# Patient Record
Sex: FEMALE | Race: WHITE
[De-identification: names, ages, dates, MRNs, and addresses within clinical notes are randomized per-mention and may not be internally consistent; named-entity substitution may affect disease eponyms.]

---

## 2019-06-24 ENCOUNTER — HOSPITAL ENCOUNTER (OUTPATIENT)
Dept: HOSPITAL 47 - RADCTMAIN | Age: 24
Discharge: HOME | End: 2019-06-24
Payer: COMMERCIAL

## 2019-06-24 DIAGNOSIS — J32.8: Primary | ICD-10-CM

## 2019-06-24 PROCEDURE — 70460 CT HEAD/BRAIN W/DYE: CPT

## 2019-06-24 NOTE — CT
EXAMINATION TYPE: CT brain w con

 

DATE OF EXAM: 6/24/2019

 

COMPARISON: 2/7/2003

 

HISTORY: 24-year-old female Vertigo

 

CT DLP: 1198 mGycm

Automated exposure control for dose reduction was used.

 

Technique: CT scan of the head is performed with IV Contrast, patient injected with 100 mL of Isovue 
300. Coronal/sagittal reconstructions performed.

 

FINDINGS: 

There is no abnormal enhancing mass or midline shift identified.  No craniocervical junction abnormal
ity. No empty sella. The ventricles and sulci are within normal limits in size.

 

The globes are intact.

 

Lobulated polyps or mucosal retention cysts along the floors of the maxillary sinuses. Mild mucosal t
hickening ethmoid air cells. Mastoid air cells are well pneumatized. Orbits and globes are intact.

 

 

IMPRESSION: 

Negative contrast enhanced head CT exam. Mild chronic maxillary and ethmoid sinus disease.

## 2020-07-14 ENCOUNTER — HOSPITAL ENCOUNTER (EMERGENCY)
Dept: HOSPITAL 47 - EC | Age: 25
Discharge: HOME | End: 2020-07-14
Payer: COMMERCIAL

## 2020-07-14 VITALS
TEMPERATURE: 98.9 F | HEART RATE: 86 BPM | DIASTOLIC BLOOD PRESSURE: 92 MMHG | SYSTOLIC BLOOD PRESSURE: 123 MMHG | RESPIRATION RATE: 18 BRPM

## 2020-07-14 DIAGNOSIS — F41.9: Primary | ICD-10-CM

## 2020-07-14 PROCEDURE — 93005 ELECTROCARDIOGRAM TRACING: CPT

## 2020-07-14 PROCEDURE — 99283 EMERGENCY DEPT VISIT LOW MDM: CPT

## 2020-07-14 NOTE — ED
Anxiety HPI





- General


Chief Complaint: Anxiety


Stated Complaint: Anxiety


Time Seen by Provider: 07/14/20 14:07


Source: patient, family


Mode of arrival: wheelchair





- History of Present Illness


Initial Comments: 


25-year-old female presents today for chief complaint of anxiety possible panic 

attack.  Patient states she has history of depression and anxiety.  She states 

she does have a history of panic attacks.  She said she has not happened in 

quite some time.  Patient states the past 2 days should have episodes where she 

feels sweaty feels like the world is going to end and she is going to die.  

Patient states that that time she has some small inconsistent chest pain that is

sharp in nature.  She denies shortness of breath.  Patient states they go away 

after about 10 minutes.  Patient states he feels similar to when she's had panic

attacks in the past.  Patient states she is on Abilify she does not believe it 

is really helping.  Patient denies any suicidal or homicidal ideation.  Patient 

denies any current chest pain associated chest pressure.  Patient denies any 

pain with inspiration leg swelling hemoptysis history of cancer DVT or pulmonary

embolism.  Patient denies any history of clotting disorders denies any recent 

surgeries exogenous hormone use or pregnancy.  Patient denies any jaw or arm 

pain.  Patient upon arrival.  She felt no signs of acute distress, VS within 

acceptable limits HR WNL.








- Related Data


Home Medications: 


                                  Previous Rx's











 Medication  Instructions  Recorded


 


ALPRAZolam [Xanax] 0.25 mg PO HS PRN 3 Days #3 tab 07/14/20











Allergies/Adverse Reactions: 


                                    Allergies











Allergy/AdvReac Type Severity Reaction Status Date / Time


 


No Known Allergies Allergy   Verified 07/14/20 13:46














Review of Systems


ROS Statement: 


Those systems with pertinent positive or pertinent negative responses have been 

documented in the HPI.





ROS Other: All systems not noted in ROS Statement are negative.





Past Medical History


Past Medical History: No Reported History


History of Any Multi-Drug Resistant Organisms: None Reported


Past Surgical History: No Surgical Hx Reported


Past Psychological History: Anxiety, Depression


Smoking Status: Never smoker


Past Alcohol Use History: Occasional


Past Drug Use History: Marijuana





General Exam





- General Exam Comments


Initial Comments: 


General:  The patient is awake and alert, in no distress


Eye:  Pupils are equal, round and reactive to light, extra-ocular movements are 

intact.  No nystagmus.  There is normal conjunctiva bilaterally.  No signs of 

icterus.  


Ears, nose, mouth and throat:  There are moist mucous membranes and no oral 

lesions. 


Neck:  The neck is supple, there is no tenderness or JVD.  


Cardiovascular:  There is a regular rate and rhythm. No murmur, rub or gallop is

 appreciated.


Respiratory:  Lungs are clear to auscultation, respirations are non-labored, 

breath sounds are equal.  No wheezes, stridor, rales, or rhonchi.


Gastrointestinal:  Soft, non-distended, non-tender abdomen without masses or 

organomegaly noted. There is no rebound or guarding present. 


Musculoskeletal:  Normal ROM, no tenderness.  Strength 5/5. Sensation intact. 

Radial pulses equal bilaterally 2+.  


Neurological:  A&O x 3. CN II-XII intact, There are no obvious motor or sensory 

deficits. Coordination appears grossly intact. Speech is normal.


Skin:  Skin is warm and dry and no rashes or lesions are noted. No LE edema or 

swelling. No calf pain to palpation.


Psychiatric:  Cooperative, appropriate mood & affect, normal judgment.  








Course


                                   Vital Signs











  07/14/20 07/14/20





  13:42 14:49


 


Temperature 98.9 F 98.9 F


 


Pulse Rate 86 86


 


Respiratory 18 18





Rate  


 


Blood Pressure 123/92 123/92


 


O2 Sat by Pulse 98 98





Oximetry  














Medical Decision Making





- Medical Decision Making


25-year-old female presented for chief complaint of anxiety. Patient has history

 of panic disorder. PE unremarkable. PERC (-). Low heart score, CP does not 

appear typical. EKG WNL Patient HR WNL. She does not appear toxic. No current 

chest pain. Patient after discussing symptoms is agreeable to discharge with 

anxiety about of medications when necessary.  She is to follow-up with 

psychiatry return for homicide ideation or persistent chest pain. Patient 

verbalized that she feels this is all secondary to anxiety. Patient agreeable to

 discharge and return parameters. DIscused case with Dr. Vazquez who is 

agreeable to this care plan.








Disposition


Clinical Impression: 


 Anxiety, History of panic disorder





Disposition: HOME SELF-CARE


Condition: Stable


Instructions (If sedation given, give patient instructions):  Generalized 

Anxiety Disorder (ED), Panic Attack (ED)


Additional Instructions: 


Please use medication as discussed.  Please follow-up with family doctor in the 

next 2 days.  Please return to emergency room if the symptoms increase or worsen

 or for any other concerns.


Prescriptions: 


ALPRAZolam [Xanax] 0.25 mg PO HS PRN 3 Days #3 tab


 PRN Reason: Anxiety


Is patient prescribed a controlled substance at d/c from ED?: No


Referrals: 


Suzette Valadez DO [Primary Care Provider] - 1-2 days


Time of Disposition: 14:41

## 2021-12-04 ENCOUNTER — HOSPITAL ENCOUNTER (EMERGENCY)
Dept: HOSPITAL 47 - EC | Age: 26
Discharge: HOME | End: 2021-12-04
Payer: COMMERCIAL

## 2021-12-04 VITALS — HEART RATE: 90 BPM

## 2021-12-04 VITALS — RESPIRATION RATE: 18 BRPM | TEMPERATURE: 99.3 F

## 2021-12-04 VITALS — SYSTOLIC BLOOD PRESSURE: 121 MMHG | DIASTOLIC BLOOD PRESSURE: 75 MMHG

## 2021-12-04 DIAGNOSIS — U07.1: Primary | ICD-10-CM

## 2021-12-04 PROCEDURE — 71046 X-RAY EXAM CHEST 2 VIEWS: CPT

## 2021-12-04 PROCEDURE — 93005 ELECTROCARDIOGRAM TRACING: CPT

## 2021-12-04 PROCEDURE — 87635 SARS-COV-2 COVID-19 AMP PRB: CPT

## 2021-12-04 PROCEDURE — 99284 EMERGENCY DEPT VISIT MOD MDM: CPT

## 2021-12-04 NOTE — ED
General Adult HPI





- General


Chief complaint: Upper Respiratory Infection


Stated complaint: Cough/Headache/Vomiting


Time Seen by Provider: 12/04/21 13:00


Source: patient, RN notes reviewed, old records reviewed


Mode of arrival: ambulatory


Limitations: no limitations





- History of Present Illness


Initial comments: 





This is a 26 year old female presents emergency Department body aches cough and 

a low-grade fever.  Patient states it started about 4 days ago.  Patient states 

she's also lost some of her taste and smell.  Patient also said diarrhea.  

Patient complains of chest achiness as well as overall body achiness.  Patient 

came in today because she was concerned about the chest achiness.  Patient 

denies any shortness of breath or difficulty breathing.  Patient denies any 

abdominal pain patient denies any back pain.  Patient denies lightheadedness or 

dizziness.





- Related Data


                                  Previous Rx's











 Medication  Instructions  Recorded


 


ALPRAZolam [Xanax] 0.25 mg PO HS PRN 3 Days #3 tab 07/14/20











                                    Allergies











Allergy/AdvReac Type Severity Reaction Status Date / Time


 


No Known Allergies Allergy   Verified 12/04/21 11:18














Review of Systems


ROS Statement: 


Those systems with pertinent positive or pertinent negative responses have been 

documented in the HPI.





ROS Other: All systems not noted in ROS Statement are negative.





Past Medical History


Past Medical History: No Reported History


History of Any Multi-Drug Resistant Organisms: None Reported


Past Surgical History: No Surgical Hx Reported


Past Psychological History: Anxiety, Depression


Smoking Status: Never smoker


Past Alcohol Use History: Occasional


Past Drug Use History: Marijuana





General Exam





- General Exam Comments


Initial Comments: 





GENERAL:


Patient is well-developed and well-nourished.  Patient is nontoxic and well-

hydrated and is in mild distress.





ENT:


Neck is soft and supple.  No significant lymphadenopathy is noted.  Oropharynx 

is clear.  Moist mucous membranes.  Neck has full range of motion without 

eliciting any pain.  





EYES:


The sclera were anicteric and conjunctiva were pink and moist.  Extraocular 

movements were intact and pupils were equal round and reactive to light.  Eye

lids were unremarkable.





PULMONARY:


Unlabored respirations.  Good breath sounds bilaterally.  No audible rales 

rhonchi or wheezing was noted.





CARDIOVASCULAR:


There is a regular rate and rhythm without any murmurs gallops or rubs. 





ABDOMEN:


Soft and nontender with normal bowel sounds.  





SKIN:


Skin is clear with no lesions or rashes and otherwise unremarkable.





NEUROLOGIC:


Patient is alert and oriented x3.  Cranial nerves II through XII are grossly 

intact.  Motor and sensory are also intact.  Normal speech, volume and content. 

 Symmetrical smile.





MUSCULOSKELETAL:


Normal extremities with adequate strength and full range of motion.  





LYMPHATICS:


No significant lymphadenopathy is noted





PSYCHIATRIC:


Normal psychiatric evaluation.  


Limitations: no limitations





Course


                                   Vital Signs











  12/04/21 12/04/21 12/04/21





  11:18 12:50 12:52


 


Temperature 99.3 F  


 


Pulse Rate 89 90 


 


Respiratory 18  





Rate   


 


Blood Pressure   121/75


 


O2 Sat by Pulse  98 





Oximetry   














Medical Decision Making





- Medical Decision Making





Patient is positive for COVID.  Chest x-ray shows no signs of pneumonia.





I offered the patient monoclonal antibodies but she refused.





Patient's EKG shows normal sinus rhythm at 80 bpm OK interval is 150 QRS is 82 

QT interval 348 QTC is 421 per patient's EKG shows no ST segment elevation or 

depression.





- Lab Data


                                   Lab Results











  12/04/21 Range/Units





  11:25 


 


Coronavirus (PCR)  Detected A  (Not Detectd)  














Disposition


Clinical Impression: 


 COVID





Disposition: HOME SELF-CARE


Condition: Good


Instructions (If sedation given, give patient instructions):  Coronavirus 

Disease 2019 (COVID-19)


Is patient prescribed a controlled substance at d/c from ED?: No


Referrals: 


Ehsan Hsu MD [Primary Care Provider] - 1-2 days


Time of Disposition: 13:55

## 2021-12-04 NOTE — XR
EXAMINATION TYPE: XR chest 2V

 

DATE OF EXAM: 12/4/2021

 

COMPARISON: The

 

HISTORY: Chest pain

 

TECHNIQUE:  Frontal and lateral views of the chest are obtained.

 

FINDINGS:  

 

There is no focal air space opacity.

 

No evidence for pneumothorax.  No pleural effusion.

 

The cardiac silhouette size is within normal limits.

 

The osseous structures are grossly intact.

 

IMPRESSION:  

 

1.  No acute cardiopulmonary process.

## 2021-12-09 ENCOUNTER — HOSPITAL ENCOUNTER (EMERGENCY)
Dept: HOSPITAL 47 - EC | Age: 26
Discharge: HOME | End: 2021-12-09
Payer: COMMERCIAL

## 2021-12-09 VITALS
RESPIRATION RATE: 18 BRPM | SYSTOLIC BLOOD PRESSURE: 114 MMHG | TEMPERATURE: 98.5 F | DIASTOLIC BLOOD PRESSURE: 81 MMHG | HEART RATE: 87 BPM

## 2021-12-09 DIAGNOSIS — U07.1: Primary | ICD-10-CM

## 2021-12-09 DIAGNOSIS — F32.A: ICD-10-CM

## 2021-12-09 DIAGNOSIS — Z79.899: ICD-10-CM

## 2021-12-09 DIAGNOSIS — F12.90: ICD-10-CM

## 2021-12-09 DIAGNOSIS — F41.9: ICD-10-CM

## 2021-12-09 PROCEDURE — 99283 EMERGENCY DEPT VISIT LOW MDM: CPT

## 2021-12-09 PROCEDURE — 96360 HYDRATION IV INFUSION INIT: CPT

## 2021-12-09 NOTE — ED
URI HPI





- General


Chief Complaint: Upper Respiratory Infection


Stated Complaint: Covid+, wants antibodies


Time Seen by Provider: 12/09/21 02:49


Source: patient, RN notes reviewed


Mode of arrival: ambulatory


Limitations: no limitations





- History of Present Illness


Initial Comments: 


26-year-old female presents emergency Department chief complaint of COVID-19.  

Patient was seen here 5 days ago was tested positive for COVID-19.  Patient 

declined monoclonal advised at the time she states that she's not feeling better

and is requesting monoclonal antibodies.  Patient states she's had fever cough 

congestion body aches.








- Related Data


                                  Previous Rx's











 Medication  Instructions  Recorded


 


ALPRAZolam [Xanax] 0.25 mg PO HS PRN 3 Days #3 tab 07/14/20











                                    Allergies











Allergy/AdvReac Type Severity Reaction Status Date / Time


 


No Known Allergies Allergy   Verified 12/09/21 02:48














Review of Systems


ROS Statement: 


Those systems with pertinent positive or pertinent negative responses have been 

documented in the HPI.





ROS Other: All systems not noted in ROS Statement are negative.





Past Medical History


Past Medical History: No Reported History


History of Any Multi-Drug Resistant Organisms: None Reported


Past Surgical History: No Surgical Hx Reported


Past Psychological History: Anxiety, Depression


Smoking Status: Never smoker


Past Alcohol Use History: Occasional


Past Drug Use History: Marijuana





General Exam


Limitations: no limitations


General appearance: alert, in no apparent distress


Head exam: Present: atraumatic, normocephalic, normal inspection


Eye exam: Present: normal appearance, PERRL, EOMI.  Absent: scleral icterus, 

conjunctival injection, periorbital swelling


ENT exam: Present: normal exam, normal oropharynx, mucous membranes moist


Neck exam: Present: normal inspection.  Absent: tenderness, meningismus, 

lymphadenopathy


Respiratory exam: Present: normal lung sounds bilaterally.  Absent: respiratory 

distress, wheezes, rales, rhonchi, stridor


Cardiovascular Exam: Present: regular rate, normal rhythm, normal heart sounds. 

 Absent: systolic murmur, diastolic murmur, rubs, gallop, clicks





Course





                                   Vital Signs











  12/09/21





  02:46


 


Temperature 98.5 F


 


Pulse Rate 87


 


Respiratory 18





Rate 


 


Blood Pressure 114/81


 


O2 Sat by Pulse 98





Oximetry 














Medical Decision Making





- Medical Decision Making





Patient received monoclonal antibodies and will be discharged in stable 

condition.





Disposition


Clinical Impression: 


 COVID-19





Disposition: HOME SELF-CARE


Condition: Stable


Instructions (If sedation given, give patient instructions):  Coronavirus 

Disease 2019 (COVID-19)


Additional Instructions: 


Please return to the Emergency Department if symptoms worsen or any other 

concerns.


Is patient prescribed a controlled substance at d/c from ED?: No


Referrals: 


Suzette Valadez DO [Primary Care Provider] - 1-2 days


Time of Disposition: 02:51